# Patient Record
Sex: FEMALE | Race: WHITE | Employment: UNEMPLOYED | URBAN - METROPOLITAN AREA
[De-identification: names, ages, dates, MRNs, and addresses within clinical notes are randomized per-mention and may not be internally consistent; named-entity substitution may affect disease eponyms.]

---

## 2019-02-01 ENCOUNTER — APPOINTMENT (OUTPATIENT)
Dept: OPTOMETRY | Facility: CLINIC | Age: 64
End: 2019-02-01
Payer: COMMERCIAL

## 2019-02-01 ENCOUNTER — OFFICE VISIT (OUTPATIENT)
Dept: OPTOMETRY | Facility: CLINIC | Age: 64
End: 2019-02-01
Payer: COMMERCIAL

## 2019-02-01 DIAGNOSIS — H01.136 ECZEMATOUS DERMATITIS OF EYELIDS OF BOTH EYES: ICD-10-CM

## 2019-02-01 DIAGNOSIS — H04.129 DRY EYE: ICD-10-CM

## 2019-02-01 DIAGNOSIS — H52.203 ASTIGMATISM OF BOTH EYES, UNSPECIFIED TYPE: Primary | ICD-10-CM

## 2019-02-01 DIAGNOSIS — H01.133 ECZEMATOUS DERMATITIS OF EYELIDS OF BOTH EYES: ICD-10-CM

## 2019-02-01 DIAGNOSIS — H17.9 CORNEAL SCAR, RIGHT EYE: ICD-10-CM

## 2019-02-01 DIAGNOSIS — H52.4 PRESBYOPIA: ICD-10-CM

## 2019-02-01 DIAGNOSIS — H52.12 MYOPIA OF LEFT EYE WITH ASTIGMATISM: ICD-10-CM

## 2019-02-01 DIAGNOSIS — H26.9 BILATERAL INCIPIENT CATARACTS: ICD-10-CM

## 2019-02-01 DIAGNOSIS — H52.202 MYOPIA OF LEFT EYE WITH ASTIGMATISM: ICD-10-CM

## 2019-02-01 PROCEDURE — 92004 COMPRE OPH EXAM NEW PT 1/>: CPT | Performed by: OPTOMETRIST

## 2019-02-01 PROCEDURE — 92015 DETERMINE REFRACTIVE STATE: CPT | Performed by: OPTOMETRIST

## 2019-02-01 PROCEDURE — V2200 LENS SPHER BIFOC PLANO 4.00D: HCPCS | Mod: LT | Performed by: OPTOMETRIST

## 2019-02-01 PROCEDURE — V2020 VISION SVCS FRAMES PURCHASES: HCPCS | Performed by: OPTOMETRIST

## 2019-02-01 RX ORDER — CELECOXIB 200 MG/1
200 CAPSULE ORAL DAILY PRN
Status: ON HOLD | COMMUNITY
Start: 2018-07-20 | End: 2019-04-26

## 2019-02-01 ASSESSMENT — REFRACTION_MANIFEST
OD_SPHERE: -0.75
OS_SPHERE: -1.25
OS_SPHERE: -1.50
METHOD_AUTOREFRACTION: 1
OD_SPHERE: -0.75
OS_CYLINDER: SPHERE
OS_AXIS: 178
OD_AXIS: 163
OD_CYLINDER: +1.00
OD_CYLINDER: +1.00
OS_CYLINDER: +0.75
OD_AXIS: 168

## 2019-02-01 ASSESSMENT — VISUAL ACUITY
OD_SC+: -1
OS_SC: 20/40
OD_SC: 20/20
OS_SC: 20/30-3
METHOD: SNELLEN - LINEAR
OD_SC: 20/40
OS_SC+: -3

## 2019-02-01 ASSESSMENT — KERATOMETRY
OS_K2POWER_DIOPTERS: 43.37
OD_K2POWER_DIOPTERS: 43.50
OD_K1POWER_DIOPTERS: 43.12
OD_AXISANGLE_DEGREES: 89
OS_K1POWER_DIOPTERS: 43.37
OS_AXISANGLE_DEGREES: 90
OS_AXISANGLE2_DEGREES: 180
OD_AXISANGLE2_DEGREES: 179

## 2019-02-01 ASSESSMENT — CONF VISUAL FIELD
METHOD: COUNTING FINGERS
OD_NORMAL: 1
OS_NORMAL: 1

## 2019-02-01 ASSESSMENT — TONOMETRY
OS_IOP_MMHG: 16
IOP_METHOD: APPLANATION
OD_IOP_MMHG: 16

## 2019-02-01 ASSESSMENT — CUP TO DISC RATIO
OS_RATIO: 0.25
OD_RATIO: 0.25

## 2019-02-01 ASSESSMENT — EXTERNAL EXAM - RIGHT EYE: OD_EXAM: NORMAL

## 2019-02-01 ASSESSMENT — EXTERNAL EXAM - LEFT EYE: OS_EXAM: NORMAL

## 2019-02-01 NOTE — PROGRESS NOTES
Chief Complaint   Patient presents with     Annual Eye Exam        Patient reports she had Lasik 12-14 years ago, did have an enhancement after 6-8 weeks    Patient feels she has blepharitis on both lids      Last Eye Exam: 1 year ago at [facility]  Dilated Previously: Yes    What are you currently using to see?  readers  And has distance glasses , not with        Distance Vision Acuity: Noticed gradual change in both eyes    Near Vision Acuity: Satisfied with vision while reading  unaided    Eye Comfort: dry  Do you use eye drops? : Yes: Refresh daily  Occupation or Hobbies: Unemployed, crocheting and kniting    Stefani Silva, Optometric Assistant          Medical, surgical and family histories reviewed and updated 2/1/2019.       OBJECTIVE: See Ophthalmology exam    ASSESSMENT:    ICD-10-CM    1. Astigmatism of both eyes, unspecified type H52.203    2. Myopia of left eye with astigmatism H52.12     H52.202    3. Presbyopia H52.4    4. 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

## 2019-02-01 NOTE — PATIENT INSTRUCTIONS
For eczema  Use ointment on lids two times daily for one week or less, than as needed for flare ups, but not long term   Side effects are glaucoma and cataracts if used long term     DRY EYE TREATMENT    I recommend using artificial tears for your dry eye. There are over the counter drops that work well and may be used up to 4x daily. ( systane balance, refresh optive, soothe xp)   If you need more than 4 drops daily, use a preservative free product which come in individual vials which may be used for 24 hours and discarded.     Artificial tears work best as a preventative and not as well after your eyes are starting to bother you.  It may take 4- 6 weeks of using the drops before you notice improvement.  If after that time you are still having problems schedule an appointment for an evaluation and discussion of different treatments.  Dry eyes are a chronic condition and you may have more symptoms at certain times of the year.      Additional recommended treatment:  Warm compresses once to twice daily for 5-10 minutes    Directions for warm soaks  There are few methods for hot compresses. Moisten a washcloth with hot water, or microwave for 10 seconds, being careful to not get the cloth too hot.   Then put the washcloth onto your eyelids for 5 minutes. It will cool quickly so a rice pack or eyemask that can be heated and laid on top of the washcloth will help retain the heat.          Omega 3 fatty acid supplements taken 1-2x daily  Recommend  at least  2000mg omega 3  800 EPA  600 DHA    Blink regularly  Stay hydrated/ increase humidity  Wear sunglasses

## 2019-02-01 NOTE — LETTER
2/1/2019         RE: Serena Trujillo  [address]  [place] [address]        Dear Colleague,    Thank you for referring your patient, Serena Trujillo, to the [facility]. Please see a copy of my visit note below.    Chief Complaint   Patient presents with     Annual Eye Exam        Patient reports she had Lasik 12-14 years ago, did have an enhancement after 6-8 weeks    Patient feels she has blepharitis on both lids      Last Eye Exam: 1 year ago at [facility]  Dilated Previously: Yes    What are you currently using to see?  readers  And has distance glasses , not with        Distance Vision Acuity: Noticed gradual change in both eyes    Near Vision Acuity: Satisfied with vision while reading  unaided    Eye Comfort: dry  Do you use eye drops? : Yes: Refresh daily  Occupation or Hobbies: Unemployed, crocheting and kniting    Stefani Silva, Optometric Assistant          Medical, surgical and family histories reviewed and updated 2/1/2019.       OBJECTIVE: See Ophthalmology exam    ASSESSMENT:    ICD-10-CM    1. Astigmatism of both eyes, unspecified type H52.203    2. Myopia of left eye with astigmatism H52.12     H52.202    3. Presbyopia H52.4    4. 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        Sincerely,        Fifi Philippe, OD

## 2019-04-25 ENCOUNTER — HOSPITAL ENCOUNTER (OUTPATIENT)
Facility: CLINIC | Age: 64
Setting detail: OBSERVATION
Discharge: HOME | End: 2019-04-26
Attending: EMERGENCY MEDICINE | Admitting: HOSPITALIST
Payer: COMMERCIAL

## 2019-04-25 ENCOUNTER — APPOINTMENT (OUTPATIENT)
Dept: MRI IMAGING | Facility: CLINIC | Age: 64
End: 2019-04-25
Attending: EMERGENCY MEDICINE
Payer: COMMERCIAL

## 2019-04-25 ENCOUNTER — APPOINTMENT (OUTPATIENT)
Dept: ULTRASOUND IMAGING | Facility: CLINIC | Age: 64
End: 2019-04-25
Attending: EMERGENCY MEDICINE
Payer: COMMERCIAL

## 2019-04-25 DIAGNOSIS — G89.29 CHRONIC PAIN OF RIGHT KNEE: Primary | ICD-10-CM

## 2019-04-25 DIAGNOSIS — M25.561 CHRONIC PAIN OF RIGHT KNEE: Primary | ICD-10-CM

## 2019-04-25 DIAGNOSIS — R10.11 RUQ ABDOMINAL PAIN: ICD-10-CM

## 2019-04-25 PROBLEM — R10.9 ABDOMINAL PAIN: Status: ACTIVE | Noted: 2019-04-25

## 2019-04-25 LAB
ALBUMIN SERPL-MCNC: 4.1 G/DL (ref 3.4–5)
ALP SERPL-CCNC: 96 U/L (ref 40–150)
ALT SERPL W P-5'-P-CCNC: 27 U/L (ref 0–50)
ANION GAP SERPL CALCULATED.3IONS-SCNC: 9 MMOL/L (ref 3–14)
AST SERPL W P-5'-P-CCNC: 18 U/L (ref 0–45)
BASOPHILS # BLD AUTO: 0.1 10E9/L (ref 0–0.2)
BASOPHILS NFR BLD AUTO: 0.5 %
BILIRUB SERPL-MCNC: 0.3 MG/DL (ref 0.2–1.3)
BUN SERPL-MCNC: 21 MG/DL (ref 7–30)
CALCIUM SERPL-MCNC: 9 MG/DL (ref 8.5–10.1)
CHLORIDE SERPL-SCNC: 101 MMOL/L (ref 94–109)
CO2 SERPL-SCNC: 25 MMOL/L (ref 20–32)
CREAT SERPL-MCNC: 0.82 MG/DL (ref 0.52–1.04)
DIFFERENTIAL METHOD BLD: ABNORMAL
EOSINOPHIL # BLD AUTO: 0.1 10E9/L (ref 0–0.7)
EOSINOPHIL NFR BLD AUTO: 0.5 %
ERYTHROCYTE [DISTWIDTH] IN BLOOD BY AUTOMATED COUNT: 13.9 % (ref 10–15)
GFR SERPL CREATININE-BSD FRML MDRD: 75 ML/MIN/{1.73_M2}
GLUCOSE SERPL-MCNC: 95 MG/DL (ref 70–99)
HCT VFR BLD AUTO: 41.4 % (ref 35–47)
HGB BLD-MCNC: 13.3 G/DL (ref 11.7–15.7)
IMM GRANULOCYTES # BLD: 0.1 10E9/L (ref 0–0.4)
IMM GRANULOCYTES NFR BLD: 0.4 %
LACTATE BLD-SCNC: 0.8 MMOL/L (ref 0.7–2)
LIPASE SERPL-CCNC: 250 U/L (ref 73–393)
LYMPHOCYTES # BLD AUTO: 2.3 10E9/L (ref 0.8–5.3)
LYMPHOCYTES NFR BLD AUTO: 14.3 %
MCH RBC QN AUTO: 26.7 PG (ref 26.5–33)
MCHC RBC AUTO-ENTMCNC: 32.1 G/DL (ref 31.5–36.5)
MCV RBC AUTO: 83 FL (ref 78–100)
MONOCYTES # BLD AUTO: 0.7 10E9/L (ref 0–1.3)
MONOCYTES NFR BLD AUTO: 4.6 %
NEUTROPHILS # BLD AUTO: 12.8 10E9/L (ref 1.6–8.3)
NEUTROPHILS NFR BLD AUTO: 79.7 %
NRBC # BLD AUTO: 0 10*3/UL
NRBC BLD AUTO-RTO: 0 /100
PLATELET # BLD AUTO: 387 10E9/L (ref 150–450)
POTASSIUM SERPL-SCNC: 3.9 MMOL/L (ref 3.4–5.3)
PROCALCITONIN SERPL-MCNC: <0.05 NG/ML
PROT SERPL-MCNC: 7.6 G/DL (ref 6.8–8.8)
RBC # BLD AUTO: 4.99 10E12/L (ref 3.8–5.2)
SODIUM SERPL-SCNC: 135 MMOL/L (ref 133–144)
TROPONIN I SERPL-MCNC: <0.015 UG/L (ref 0–0.04)
WBC # BLD AUTO: 16 10E9/L (ref 4–11)

## 2019-04-25 PROCEDURE — 76705 ECHO EXAM OF ABDOMEN: CPT

## 2019-04-25 PROCEDURE — 99285 EMERGENCY DEPT VISIT HI MDM: CPT | Mod: 25

## 2019-04-25 PROCEDURE — 84145 PROCALCITONIN (PCT): CPT | Performed by: HOSPITALIST

## 2019-04-25 PROCEDURE — 93005 ELECTROCARDIOGRAM TRACING: CPT

## 2019-04-25 PROCEDURE — G0378 HOSPITAL OBSERVATION PER HR: HCPCS

## 2019-04-25 PROCEDURE — 25500064 ZZH RX 255 OP 636: Performed by: EMERGENCY MEDICINE

## 2019-04-25 PROCEDURE — 87040 BLOOD CULTURE FOR BACTERIA: CPT | Performed by: EMERGENCY MEDICINE

## 2019-04-25 PROCEDURE — 25800030 ZZH RX IP 258 OP 636: Performed by: HOSPITALIST

## 2019-04-25 PROCEDURE — 96375 TX/PRO/DX INJ NEW DRUG ADDON: CPT

## 2019-04-25 PROCEDURE — 36415 COLL VENOUS BLD VENIPUNCTURE: CPT | Performed by: EMERGENCY MEDICINE

## 2019-04-25 PROCEDURE — 80053 COMPREHEN METABOLIC PANEL: CPT | Performed by: EMERGENCY MEDICINE

## 2019-04-25 PROCEDURE — 96365 THER/PROPH/DIAG IV INF INIT: CPT

## 2019-04-25 PROCEDURE — 83690 ASSAY OF LIPASE: CPT | Performed by: EMERGENCY MEDICINE

## 2019-04-25 PROCEDURE — 84484 ASSAY OF TROPONIN QUANT: CPT | Performed by: EMERGENCY MEDICINE

## 2019-04-25 PROCEDURE — 25000132 ZZH RX MED GY IP 250 OP 250 PS 637: Performed by: EMERGENCY MEDICINE

## 2019-04-25 PROCEDURE — 74183 MRI ABD W/O CNTR FLWD CNTR: CPT

## 2019-04-25 PROCEDURE — 83605 ASSAY OF LACTIC ACID: CPT | Performed by: EMERGENCY MEDICINE

## 2019-04-25 PROCEDURE — 96361 HYDRATE IV INFUSION ADD-ON: CPT

## 2019-04-25 PROCEDURE — 36415 COLL VENOUS BLD VENIPUNCTURE: CPT | Performed by: HOSPITALIST

## 2019-04-25 PROCEDURE — 25000125 ZZHC RX 250: Performed by: EMERGENCY MEDICINE

## 2019-04-25 PROCEDURE — A9585 GADOBUTROL INJECTION: HCPCS | Performed by: EMERGENCY MEDICINE

## 2019-04-25 PROCEDURE — 85025 COMPLETE CBC W/AUTO DIFF WBC: CPT | Performed by: EMERGENCY MEDICINE

## 2019-04-25 PROCEDURE — 25000128 H RX IP 250 OP 636: Performed by: EMERGENCY MEDICINE

## 2019-04-25 PROCEDURE — 99220 ZZC INITIAL OBSERVATION CARE,LEVL III: CPT | Performed by: HOSPITALIST

## 2019-04-25 RX ORDER — AMOXICILLIN 250 MG
2 CAPSULE ORAL 2 TIMES DAILY PRN
Status: DISCONTINUED | OUTPATIENT
Start: 2019-04-25 | End: 2019-04-26 | Stop reason: HOSPADM

## 2019-04-25 RX ORDER — CEFTRIAXONE 1 G/1
1 INJECTION, POWDER, FOR SOLUTION INTRAMUSCULAR; INTRAVENOUS ONCE
Status: COMPLETED | OUTPATIENT
Start: 2019-04-25 | End: 2019-04-25

## 2019-04-25 RX ORDER — PROCHLORPERAZINE MALEATE 10 MG
10 TABLET ORAL EVERY 6 HOURS PRN
Status: DISCONTINUED | OUTPATIENT
Start: 2019-04-25 | End: 2019-04-26 | Stop reason: HOSPADM

## 2019-04-25 RX ORDER — LANOLIN ALCOHOL/MO/W.PET/CERES
3 CREAM (GRAM) TOPICAL
Status: DISCONTINUED | OUTPATIENT
Start: 2019-04-25 | End: 2019-04-26 | Stop reason: HOSPADM

## 2019-04-25 RX ORDER — VALACYCLOVIR HYDROCHLORIDE 1 G/1
1000 TABLET, FILM COATED ORAL 2 TIMES DAILY
COMMUNITY

## 2019-04-25 RX ORDER — POLYETHYLENE GLYCOL 3350 17 G/17G
17 POWDER, FOR SOLUTION ORAL DAILY PRN
Status: DISCONTINUED | OUTPATIENT
Start: 2019-04-25 | End: 2019-04-26 | Stop reason: HOSPADM

## 2019-04-25 RX ORDER — ONDANSETRON 2 MG/ML
4 INJECTION INTRAMUSCULAR; INTRAVENOUS EVERY 6 HOURS PRN
Status: DISCONTINUED | OUTPATIENT
Start: 2019-04-25 | End: 2019-04-26 | Stop reason: HOSPADM

## 2019-04-25 RX ORDER — AMOXICILLIN 250 MG
1 CAPSULE ORAL 2 TIMES DAILY PRN
Status: DISCONTINUED | OUTPATIENT
Start: 2019-04-25 | End: 2019-04-26 | Stop reason: HOSPADM

## 2019-04-25 RX ORDER — ACETAMINOPHEN 650 MG/1
650 SUPPOSITORY RECTAL EVERY 4 HOURS PRN
Status: DISCONTINUED | OUTPATIENT
Start: 2019-04-25 | End: 2019-04-26 | Stop reason: HOSPADM

## 2019-04-25 RX ORDER — BISACODYL 10 MG
10 SUPPOSITORY, RECTAL RECTAL DAILY PRN
Status: DISCONTINUED | OUTPATIENT
Start: 2019-04-25 | End: 2019-04-26 | Stop reason: HOSPADM

## 2019-04-25 RX ORDER — PROCHLORPERAZINE 25 MG
25 SUPPOSITORY, RECTAL RECTAL EVERY 12 HOURS PRN
Status: DISCONTINUED | OUTPATIENT
Start: 2019-04-25 | End: 2019-04-26 | Stop reason: HOSPADM

## 2019-04-25 RX ORDER — ONDANSETRON 4 MG/1
4 TABLET, ORALLY DISINTEGRATING ORAL EVERY 6 HOURS PRN
Status: DISCONTINUED | OUTPATIENT
Start: 2019-04-25 | End: 2019-04-26 | Stop reason: HOSPADM

## 2019-04-25 RX ORDER — SODIUM CHLORIDE, SODIUM LACTATE, POTASSIUM CHLORIDE, CALCIUM CHLORIDE 600; 310; 30; 20 MG/100ML; MG/100ML; MG/100ML; MG/100ML
INJECTION, SOLUTION INTRAVENOUS CONTINUOUS
Status: DISCONTINUED | OUTPATIENT
Start: 2019-04-25 | End: 2019-04-26

## 2019-04-25 RX ORDER — LIDOCAINE HYDROCHLORIDE 20 MG/ML
JELLY TOPICAL DAILY PRN
COMMUNITY

## 2019-04-25 RX ORDER — GADOBUTROL 604.72 MG/ML
10 INJECTION INTRAVENOUS ONCE
Status: COMPLETED | OUTPATIENT
Start: 2019-04-25 | End: 2019-04-25

## 2019-04-25 RX ORDER — ACETAMINOPHEN 325 MG/1
650 TABLET ORAL EVERY 4 HOURS PRN
Status: DISCONTINUED | OUTPATIENT
Start: 2019-04-25 | End: 2019-04-26 | Stop reason: HOSPADM

## 2019-04-25 RX ORDER — BUPROPION HYDROCHLORIDE 300 MG/1
300 TABLET ORAL EVERY MORNING
COMMUNITY

## 2019-04-25 RX ORDER — NALOXONE HYDROCHLORIDE 0.4 MG/ML
.1-.4 INJECTION, SOLUTION INTRAMUSCULAR; INTRAVENOUS; SUBCUTANEOUS
Status: DISCONTINUED | OUTPATIENT
Start: 2019-04-25 | End: 2019-04-26 | Stop reason: HOSPADM

## 2019-04-25 RX ADMIN — SODIUM CHLORIDE, POTASSIUM CHLORIDE, SODIUM LACTATE AND CALCIUM CHLORIDE: 600; 310; 30; 20 INJECTION, SOLUTION INTRAVENOUS at 19:20

## 2019-04-25 RX ADMIN — LIDOCAINE HYDROCHLORIDE 30 ML: 20 SOLUTION ORAL; TOPICAL at 15:18

## 2019-04-25 RX ADMIN — FAMOTIDINE 20 MG: 10 INJECTION INTRAVENOUS at 15:15

## 2019-04-25 RX ADMIN — GADOBUTROL 8 ML: 604.72 INJECTION INTRAVENOUS at 17:39

## 2019-04-25 RX ADMIN — CEFTRIAXONE SODIUM 1 G: 1 INJECTION, POWDER, FOR SOLUTION INTRAMUSCULAR; INTRAVENOUS at 16:29

## 2019-04-25 RX ADMIN — SODIUM CHLORIDE 1000 ML: 9 INJECTION, SOLUTION INTRAVENOUS at 15:15

## 2019-04-25 ASSESSMENT — MIFFLIN-ST. JEOR
SCORE: 1258.49
SCORE: 1281.38

## 2019-04-25 ASSESSMENT — ENCOUNTER SYMPTOMS
FEVER: 0
ABDOMINAL PAIN: 1
COUGH: 0
VOMITING: 1
SHORTNESS OF BREATH: 0

## 2019-04-25 NOTE — ED NOTES
"[facility]  ED Nurse Handoff Report    Serena Trujillo is a 63 year old female   ED Chief complaint: Abdominal Pain  . ED Diagnosis:   Final diagnoses:   None     Allergies:   Allergies   Allergen Reactions     Morphine      Penicillins      Ultram [Tramadol]        Code Status: Full Code  Activity level - Baseline/Home:  Independent. Activity Level - Current:   Independent. Lift room needed: No. Bariatric: No   Needed: No   Isolation: No. Infection: Not Applicable.     Vital Signs:   Vitals:    04/25/19 1307 04/25/19 1520   BP: (!) 203/108 171/87   Pulse:  81   Resp: 20    Temp: 97.8  F (36.6  C)    TempSrc: Oral    SpO2: 97% 97%   Weight: 77.1 kg (170 lb)    Height: 1.549 m (5' 1\")        Cardiac Rhythm:  ,      Pain level: 0-10 Pain Scale: 8  Patient confused: No. Patient Falls Risk: No.   Elimination Status: Has voided   Patient Report - Initial Complaint: abdominal pain. Focused Assessment:    Gastrointestinal - GI WDL: GI symptoms; nausea and vomiting  Nausea/Vomiting Signs/Symptoms: emesis  GI Signs/Symptoms: abdominal pain (Pt reports upper quadrant abd pain) Gastrointestinal Comment: Pt states that pain began yesterday; subsided for some time and then started again   Tests Performed:   US Abdomen Limited   Preliminary Result   IMPRESSION:    1. The common duct is mildly dilated at 1 cm, with no definite cause   identified.   2. Otherwise unremarkable right upper quadrant ultrasound.         Labs Ordered and Resulted from Time of ED Arrival Up to the Time of Departure from the ED   CBC WITH PLATELETS DIFFERENTIAL - Abnormal; Notable for the following components:       Result Value    WBC 16.0 (*)     Absolute Neutrophil 12.8 (*)     All other components within normal limits   LACTIC ACID WHOLE BLOOD   COMPREHENSIVE METABOLIC PANEL   LIPASE   TROPONIN I       Treatments provided: monitoring  Family Comments:  present  OBS brochure/video discussed/provided to patient:  " Yes  ED Medications:   Medications   0.9% sodium chloride BOLUS (1,000 mLs Intravenous New Bag 4/25/19 1515)   lidocaine VISCOUS (XYLOCAINE) 2 % 15 mL, alum & mag hydroxide-simethicone (MYLANTA ES/MAALOX  ES) 15 mL GI Cocktail (30 mLs Oral Given 4/25/19 1518)   famotidine (PEPCID) injection 20 mg (20 mg Intravenous Given 4/25/19 1515)     Drips infusing:  No  For the majority of the shift, the patient's behavior Green. Interventions performed were frequent rounding.     Severe Sepsis OR Septic Shock Diagnosis Present: No      ED Nurse Name/Phone Number: Zonia Moralez,   3:46 PM    RECEIVING UNIT ED HANDOFF REVIEW    Above ED Nurse Handoff Report was reviewed: Yes  Reviewed by: Anum Ward on April 25, 2019 at 5:29 PM

## 2019-04-25 NOTE — PHARMACY-ADMISSION MEDICATION HISTORY
Admission medication history interview status for this patient is complete. See [facility] admission navigator for allergy information, prior to admission medications and immunization status.     Medication history interview source(s):Patient  Medication history resources (including written lists, pill bottles, clinic record):Care Everywhere  [facility]    Changes made to PTA medication list:  Added: Lysine, Valtrex, Isiah/mag/zn  Deleted: Maxitrol  Changed: Wellbutrin to 300mg     Actions taken by pharmacist (provider contacted, etc):None     Additional medication history information:None    Medication reconciliation/reorder completed by provider prior to medication history? No    Do you take OTC medications (eg tylenol, ibuprofen, fish oil, eye/ear drops, etc)? Y    Prior to Admission medications    Medication Sig Last Dose Taking? Auth Provider   buPROPion (WELLBUTRIN XL) 300 MG 24 hr tablet Take 300 mg by mouth every morning 4/25/2019 at am Yes Unknown, Entered By History   Calcium-Magnesium-Zinc (ISIAH-MAG-ZINC OR) Take 1 tablet by mouth daily 4/25/2019 at Unknown time Yes Unknown, Entered By History   Coenzyme Q10-Vitamin E 100-100 MG-UNIT CAPS Take 1 capsule by mouth daily  4/25/2019 at am Yes Reported, Patient   diclofenac (VOLTAREN) 1 % topical gel Apply topically daily as needed  4/25/2019 at Unknown time Yes Reported, Patient   L-FORMULA LYSINE HCL PO Take 1 tablet by mouth daily 4/25/2019 at Unknown time Yes Unknown, Entered By History   loratadine-pseudoePHEDrine (CLARITIN-D 24-HOUR)  MG 24 hr tablet Take 1 tablet by mouth daily  4/25/2019 at Unknown time Yes Reported, Patient   polyethylene glycol 0.4%- propylene glycol 0.3% (SYSTANE ULTRA) 0.4-0.3 % SOLN ophthalmic solution Place 1 drop into both eyes 4 times daily  4/25/2019 at x1 Yes Reported, Patient   valACYclovir (VALTREX) 1000 mg tablet Take 1,000 mg by mouth 2 times daily 4/24/2019 at Unknown time Yes Unknown, Entered By History    celecoxib (CELEBREX) 200 MG capsule Take 200 mg by mouth daily as needed  More than a month at Unknown time  Reported, Patient   lidocaine (XYLOCAINE) 2 % external gel Apply topically daily as needed for moderate pain More than a month at Unknown time  Unknown, Entered By History

## 2019-04-25 NOTE — H&P
[facility]  Hospitalist H&P    Name: Serena Trujillo      MRN: [id number]  YOB: 1955    Age: 63 year old  Date of admission: 4/25/2019  Primary care provider: Sam Jovel            Assessment and Plan:   Serena Trujillo is a 63 year old female with a history of arthritis, appendectomy, hysterectomy, chronic NSAID use, depression who presents with abdominal pain (epigastric) likely related to gastritis.    1. Suspected gastritis: Large amounts of NSAID use. Pain in epigastric area. LFTs and lipase normal. US with borderline dilated CBD but no stone seen so check MRCP. If choledocholithiasis present will need GI consult and ERCP. NPO, IVF, pain/nausea control. Start IV protonix 40 mg daily.  2. Leukocytosis: Likely WBC stress demargination. No evidence of infection. Check procal. Received IV rocephin and flagyl in ED. No fever. Follow up BCx. Will not continue abx.    Code status: FULL.  Admit to OBS.  Prophylaxis: None.  Disposition: Home tomorrow.            Chief Complaint:   Abdominal pain.         History of Present Illness:   Serena Trujillo is a 63 year old female who presents with abdominal pain.  History was obtained from my discussion with the patient at the bedside.  I also discussed the case with the ED provider.  The electronic medical record was also reviewed.    She indicates her pain developed last night.  She notes most severe in the epigastric region.  Radiation to the right and left side.  Reports some mild nausea but no vomiting.  Stools are normal.  She reports large amounts of NSAID use due to arthritis.  She was able to sleep overnight but awoke with severe epigastric abdominal pain again this morning which persisted throughout the course of the day so she came to the emergency department for evaluation.  She reports a history of appendectomy and hysterectomy.  Still has her gallbladder.  Work-up in the emergency department showed  normal labs except for leukocytosis.  Right upper quadrant ultrasound showed 10 cm CBD dilation but no stone.  MRCP pending.  Surgery was contacted from the ED as well.  Patient has no other complaints at this time.            Past Medical History:     Past Medical History:   Diagnosis Date     Arthritis              Past Surgical History:     Past Surgical History:   Procedure Laterality Date     LASIK               Social History:     Social History     Tobacco Use     Smoking status: Former Smoker     Smokeless tobacco: Never Used     Tobacco comment: 26 yrs ago   Substance Use Topics     Alcohol use: Not on file             Family History:   The family history was fully reviewed and non-contributory in this case.         Allergies:     Allergies   Allergen Reactions     Morphine      Penicillins      Ultram [Tramadol]              Medications:     Prior to Admission medications    Medication Sig Last Dose Taking? Auth Provider   BUPROPION HCL ER, XL, PO Take 30 mg by mouth daily   Reported, Patient   celecoxib (CELEBREX) 200 MG capsule Take 200 mg by mouth   Reported, Patient   Coenzyme Q10-Vitamin E 100-100 MG-UNIT CAPS Take 1 capsule by mouth   Reported, Patient   diclofenac (VOLTAREN) 1 % topical gel    Reported, Patient   lidocaine (XYLOCAINE) 2 % external gel apply to affected areas up to QID prn   Reported, Patient   loratadine-pseudoePHEDrine (CLARITIN-D 24-HOUR)  MG 24 hr tablet Take 1 tablet by mouth   Reported, Patient   neomycin-polymixin-dexamethasone (MAXITROL) ophthalmic ointment Use a small amount on affected eyelid two times daily for up to a week and discontinue   Fifi Philippe, GURDEEP   polyethylene glycol 0.4%- propylene glycol 0.3% (SYSTANE ULTRA) 0.4-0.3 % SOLN ophthalmic solution Apply 1-2 drops to eye   Reported, Patient             Review of Systems:   A Comprehensive greater than 10 system review of systems was carried out.  Pertinent positives and negatives are noted above.  " Otherwise negative for contributory information.           Physical Exam:   Blood pressure 171/87, pulse 81, temperature 97.8  F (36.6  C), temperature source Oral, resp. rate 20, height 1.549 m (5' 1\"), weight 77.1 kg (170 lb), SpO2 97 %.  Wt Readings from Last 1 Encounters:   04/25/19 77.1 kg (170 lb)     Exam:  GENERAL: No apparent distress. Awake, alert, and fully oriented.  HEENT: Normocephalic, atraumatic. Extraocular movements intact.  CARDIOVASCULAR: Regular rate and rhythm without murmurs or rubs. No S3.  PULMONARY: Clear to auscultation bilaterally.  ABDOMINAL: Soft, epigastric area tender, non-distended. Bowel sounds normoactive.   EXTREMITIES: No cyanosis or clubbing. No appreciable edema.  NEUROLOGICAL: CN 2-12 grossly intact, no focal neurological deficits.  DERMATOLOGICAL: No rash, ulcer, bruising, nor jaundice.          Data:   EKG:  Personally reviewed.   ECG taken at 1431, ECG read at 1431  Normal sinus rhythm  Left axis deviation  Abnormal ECG  Rate 84 bpm. MD interval 144 ms. QRS duration 86 ms. QT/QTc 386/456 ms. P-R-T axes 0 -31 4.    Laboratory:  Recent Labs   Lab 04/25/19  1416   WBC 16.0*   HGB 13.3   HCT 41.4   MCV 83        Recent Labs   Lab 04/25/19  1416      POTASSIUM 3.9   CHLORIDE 101   CO2 25   ANIONGAP 9   GLC 95   BUN 21   CR 0.82   GFRESTIMATED 75   GFRESTBLACK 87   SATHISH 9.0     Recent Labs   Lab 04/25/19  1416   AST 18   ALT 27   ALKPHOS 96   BILITOTAL 0.3     Recent Labs   Lab 04/25/19  1416   LIPASE 250     No results for input(s): COLOR, APPEARANCE, URINEGLC, URINEBILI, URINEKETONE, SG, UBLD, URINEPH, PROTEIN, UROBILINOGEN, NITRITE, LEUKEST, RBCU, WBCU in the last 168 hours.  No results for input(s): CULT in the last 168 hours.    Imaging:  Recent Results (from the past 24 hour(s))   US Abdomen Limited    Narrative    US ABDOMEN LIMITED   4/25/2019 3:17 PM     HISTORY: Right upper quadrant pain.    COMPARISON: None.    FINDINGS: The liver is unremarkable. No " evidence for fatty  infiltration. No focal hepatic lesions. The gallbladder is  unremarkable, with no evidence for shadowing gallstones or gallbladder  wall thickening. Negative sonographic Le sign. No intrahepatic  biliary dilatation is identified. The common duct is mildly dilated,  measuring up to 1 cm in diameter. No definite cause for biliary  dilatation is identified. Limited evaluation of the right kidney is  unremarkable. Pancreas is partially obscured by overlying bowel gas,  but appears normal where seen. The abdominal aorta and IVC are of  normal caliber where visualized.      Impression    IMPRESSION:   1. The common duct is mildly dilated at 1 cm, with no definite cause  identified.  2. Otherwise unremarkable right upper quadrant ultrasound.        Heriberto Monroe DO MPH  [facility] Hospitalist  [address] [place] [address]  Pager: [phone]  04/25/2019

## 2019-04-26 VITALS
WEIGHT: 175.04 LBS | SYSTOLIC BLOOD PRESSURE: 126 MMHG | HEIGHT: 61 IN | DIASTOLIC BLOOD PRESSURE: 66 MMHG | OXYGEN SATURATION: 93 % | BODY MASS INDEX: 33.05 KG/M2 | RESPIRATION RATE: 16 BRPM | TEMPERATURE: 97.8 F | HEART RATE: 70 BPM

## 2019-04-26 LAB
ALBUMIN SERPL-MCNC: 3.2 G/DL (ref 3.4–5)
ALP SERPL-CCNC: 75 U/L (ref 40–150)
ALT SERPL W P-5'-P-CCNC: 23 U/L (ref 0–50)
ANION GAP SERPL CALCULATED.3IONS-SCNC: 3 MMOL/L (ref 3–14)
AST SERPL W P-5'-P-CCNC: 13 U/L (ref 0–45)
BILIRUB DIRECT SERPL-MCNC: <0.1 MG/DL (ref 0–0.2)
BILIRUB SERPL-MCNC: 0.4 MG/DL (ref 0.2–1.3)
BUN SERPL-MCNC: 13 MG/DL (ref 7–30)
CALCIUM SERPL-MCNC: 8.1 MG/DL (ref 8.5–10.1)
CHLORIDE SERPL-SCNC: 112 MMOL/L (ref 94–109)
CO2 SERPL-SCNC: 28 MMOL/L (ref 20–32)
CREAT SERPL-MCNC: 0.85 MG/DL (ref 0.52–1.04)
ERYTHROCYTE [DISTWIDTH] IN BLOOD BY AUTOMATED COUNT: 13.9 % (ref 10–15)
GFR SERPL CREATININE-BSD FRML MDRD: 72 ML/MIN/{1.73_M2}
GLUCOSE SERPL-MCNC: 85 MG/DL (ref 70–99)
HCT VFR BLD AUTO: 38.9 % (ref 35–47)
HGB BLD-MCNC: 12.1 G/DL (ref 11.7–15.7)
INTERPRETATION ECG - MUSE: NORMAL
MCH RBC QN AUTO: 26.1 PG (ref 26.5–33)
MCHC RBC AUTO-ENTMCNC: 31.1 G/DL (ref 31.5–36.5)
MCV RBC AUTO: 84 FL (ref 78–100)
PLATELET # BLD AUTO: 308 10E9/L (ref 150–450)
POTASSIUM SERPL-SCNC: 4 MMOL/L (ref 3.4–5.3)
PROT SERPL-MCNC: 6.1 G/DL (ref 6.8–8.8)
RBC # BLD AUTO: 4.64 10E12/L (ref 3.8–5.2)
SODIUM SERPL-SCNC: 143 MMOL/L (ref 133–144)
UPPER GI ENDOSCOPY: NORMAL
WBC # BLD AUTO: 8.2 10E9/L (ref 4–11)

## 2019-04-26 PROCEDURE — 80048 BASIC METABOLIC PNL TOTAL CA: CPT | Performed by: HOSPITALIST

## 2019-04-26 PROCEDURE — 80076 HEPATIC FUNCTION PANEL: CPT | Performed by: HOSPITALIST

## 2019-04-26 PROCEDURE — 25000128 H RX IP 250 OP 636: Performed by: HOSPITALIST

## 2019-04-26 PROCEDURE — 88341 IMHCHEM/IMCYTCHM EA ADD ANTB: CPT | Mod: 26,59 | Performed by: INTERNAL MEDICINE

## 2019-04-26 PROCEDURE — 25000128 H RX IP 250 OP 636: Performed by: INTERNAL MEDICINE

## 2019-04-26 PROCEDURE — 88342 IMHCHEM/IMCYTCHM 1ST ANTB: CPT | Mod: 26,59 | Performed by: INTERNAL MEDICINE

## 2019-04-26 PROCEDURE — G0378 HOSPITAL OBSERVATION PER HR: HCPCS

## 2019-04-26 PROCEDURE — 36415 COLL VENOUS BLD VENIPUNCTURE: CPT | Performed by: HOSPITALIST

## 2019-04-26 PROCEDURE — 25000132 ZZH RX MED GY IP 250 OP 250 PS 637: Performed by: PHYSICIAN ASSISTANT

## 2019-04-26 PROCEDURE — 25000125 ZZHC RX 250: Performed by: INTERNAL MEDICINE

## 2019-04-26 PROCEDURE — 43251 EGD REMOVE LESION SNARE: CPT | Performed by: INTERNAL MEDICINE

## 2019-04-26 PROCEDURE — 43243 EGD INJECTION VARICES: CPT | Performed by: INTERNAL MEDICINE

## 2019-04-26 PROCEDURE — 25800030 ZZH RX IP 258 OP 636: Performed by: HOSPITALIST

## 2019-04-26 PROCEDURE — 99217 ZZC OBSERVATION CARE DISCHARGE: CPT | Performed by: PHYSICIAN ASSISTANT

## 2019-04-26 PROCEDURE — 88305 TISSUE EXAM BY PATHOLOGIST: CPT | Performed by: INTERNAL MEDICINE

## 2019-04-26 PROCEDURE — 85027 COMPLETE CBC AUTOMATED: CPT | Performed by: HOSPITALIST

## 2019-04-26 PROCEDURE — G0500 MOD SEDAT ENDO SERVICE >5YRS: HCPCS | Performed by: INTERNAL MEDICINE

## 2019-04-26 PROCEDURE — 27210585 ZZH DEVICE CLIP QUICK: Performed by: INTERNAL MEDICINE

## 2019-04-26 PROCEDURE — 88341 IMHCHEM/IMCYTCHM EA ADD ANTB: CPT | Performed by: INTERNAL MEDICINE

## 2019-04-26 PROCEDURE — 43239 EGD BIOPSY SINGLE/MULTIPLE: CPT | Mod: XS | Performed by: INTERNAL MEDICINE

## 2019-04-26 PROCEDURE — 88342 IMHCHEM/IMCYTCHM 1ST ANTB: CPT | Performed by: INTERNAL MEDICINE

## 2019-04-26 PROCEDURE — 96375 TX/PRO/DX INJ NEW DRUG ADDON: CPT | Mod: 59

## 2019-04-26 PROCEDURE — C9113 INJ PANTOPRAZOLE SODIUM, VIA: HCPCS | Performed by: HOSPITALIST

## 2019-04-26 PROCEDURE — 43255 EGD CONTROL BLEEDING ANY: CPT | Performed by: INTERNAL MEDICINE

## 2019-04-26 RX ORDER — LIDOCAINE 40 MG/G
CREAM TOPICAL
Status: DISCONTINUED | OUTPATIENT
Start: 2019-04-26 | End: 2019-04-26 | Stop reason: HOSPADM

## 2019-04-26 RX ORDER — BUPROPION HYDROCHLORIDE 150 MG/1
300 TABLET ORAL EVERY MORNING
Status: DISCONTINUED | OUTPATIENT
Start: 2019-04-26 | End: 2019-04-26 | Stop reason: HOSPADM

## 2019-04-26 RX ORDER — NALOXONE HYDROCHLORIDE 0.4 MG/ML
.1-.4 INJECTION, SOLUTION INTRAMUSCULAR; INTRAVENOUS; SUBCUTANEOUS
Status: DISCONTINUED | OUTPATIENT
Start: 2019-04-26 | End: 2019-04-26 | Stop reason: HOSPADM

## 2019-04-26 RX ORDER — FLUMAZENIL 0.1 MG/ML
0.2 INJECTION, SOLUTION INTRAVENOUS
Status: DISCONTINUED | OUTPATIENT
Start: 2019-04-26 | End: 2019-04-26 | Stop reason: HOSPADM

## 2019-04-26 RX ORDER — SODIUM CHLORIDE, SODIUM LACTATE, POTASSIUM CHLORIDE, CALCIUM CHLORIDE 600; 310; 30; 20 MG/100ML; MG/100ML; MG/100ML; MG/100ML
INJECTION, SOLUTION INTRAVENOUS CONTINUOUS
Status: DISCONTINUED | OUTPATIENT
Start: 2019-04-26 | End: 2019-04-26 | Stop reason: HOSPADM

## 2019-04-26 RX ORDER — FENTANYL CITRATE 50 UG/ML
INJECTION, SOLUTION INTRAMUSCULAR; INTRAVENOUS PRN
Status: DISCONTINUED | OUTPATIENT
Start: 2019-04-26 | End: 2019-04-26 | Stop reason: HOSPADM

## 2019-04-26 RX ORDER — VALACYCLOVIR HYDROCHLORIDE 1 G/1
1000 TABLET, FILM COATED ORAL 2 TIMES DAILY
Status: DISCONTINUED | OUTPATIENT
Start: 2019-04-26 | End: 2019-04-26

## 2019-04-26 RX ORDER — BUPROPION HYDROCHLORIDE 150 MG/1
300 TABLET ORAL EVERY MORNING
Status: DISCONTINUED | OUTPATIENT
Start: 2019-04-26 | End: 2019-04-26

## 2019-04-26 RX ADMIN — SODIUM CHLORIDE, POTASSIUM CHLORIDE, SODIUM LACTATE AND CALCIUM CHLORIDE: 600; 310; 30; 20 INJECTION, SOLUTION INTRAVENOUS at 03:56

## 2019-04-26 RX ADMIN — PANTOPRAZOLE SODIUM 40 MG: 40 INJECTION, POWDER, FOR SOLUTION INTRAVENOUS at 08:28

## 2019-04-26 RX ADMIN — BUPROPION HYDROCHLORIDE 300 MG: 150 TABLET, FILM COATED, EXTENDED RELEASE ORAL at 11:55

## 2019-04-26 NOTE — PLAN OF CARE
PRIMARY DIAGNOSIS: ACUTE PAIN  OUTPATIENT/OBSERVATION GOALS TO BE MET BEFORE DISCHARGE:  1. Pain Status: Pain free.    2. Return to near baseline physical activity: Yes    3. Cleared for discharge by consultants (if involved): No    Discharge Planner Nurse   Safe discharge environment identified: Yes  Barriers to discharge: Yes       Entered by: Anum Ward 04/25/2019 10:12 PM     Please review provider order for any additional goals.   Nurse to notify provider when observation goals have been met and patient is ready for discharge.    Pt is AOx4, up independently in room, VSS. Pt denies pain/discomfort. Abdomen is soft and tender to touch, bowels hypoactive. Pt is NPO except for ice chips. IV fluids infusing. Voiding in adequate amounts. Will continue to monitor.

## 2019-04-26 NOTE — PLAN OF CARE
"PRIMARY DIAGNOSIS: ACUTE PAIN  OUTPATIENT/OBSERVATION GOALS TO BE MET BEFORE DISCHARGE:  1. Pain Status: Pain free.    2. Return to near baseline physical activity: Yes    3. Cleared for discharge by consultants (if involved): No - GI to see    Discharge Planner Nurse   Safe discharge environment identified: Yes  Barriers to discharge: Yes - GI consult       Entered by: Louise Zapata 04/26/2019        VSS. Denies except \"a tiny bit of tenderness\" in epigastric area. BS hypo. + flatus. Denies nausea. Moving ind, steady gait observed. Patient showered independently. MRCP showed questionable fluid in gall bladder, GI consult placed.     Please review provider order for any additional goals.   Nurse to notify provider when observation goals have been met and patient is ready for discharge.  "

## 2019-04-26 NOTE — DISCHARGE SUMMARY
Discharge Summary  Hospitalist Service    Serena Trujillo MRN# [id number]   YOB: 1955 Age: 63 year old     Date of Admission:  4/25/2019  Date of Discharge:  4/26/2019  Admitting Physician: Heriberto Monroe DO  Discharge Physician: Stefani Philippe PA-C  Discharging Service: Hospitalist Service     Primary Provider: Sam Jovel  Primary Care Physician Phone Number: [phone]         Discharge Diagnoses/Problem Oriented Hospital Course (Providers):    Serena Trujillo was admitted on 4/25/2019 by Heriberto Monroe DO and I would refer you to their history and physical.  The following problems were addressed during her hospitalization:    1. Epigastric abdominal pain  Acute onset of epigastric abdominal pain. LFTs and lipase are normal. US with borderline dilated CBD with MRCP showing no stone. Symptoms initially seemed consistent with gastritis likely related to frequent NSAID use. She was placed NPO with addition of Protonix with improvement in pain. GI consulted and EGD performed did not show significant inflammation, but did show non bleeding polyp in stomach that as biopsied and removed.   -Recommended stopping NSAIDS for 2 weeks and then use more sparingly and also given prescription for Omeprazole.   -Not clear what was cause of pain, esophageal spasm??    2. Incidental fluid found in gallbladder and CBD and 0.6 cm cystic lesion along the medial proximal descending duodenum  MRCP noted Mildly prominent common bile duct and appearance suggests proteinaceous fluid or possibly blood in the gallbladder and common bile duct. Also seen was a 0.6 cm high T2 signal structure consistent with small cystic lesion or possibly thin-walled duodenal diverticulum along the medial proximal descending duodenum just superior to the pancreatic head.   -Consulted GI who was not concerned about these findings.  -Verbally told patient to follow up with PCP and consider repeat  imaging in 6-12 months or consider referral for 2nd opinion.    3. Right knee arthritis  Told to stop using NSAIDS  -Gave prescription for Voltaren gel           Code Status:      Full Code        Brief Hospital Stay Summary Sent Home With Patient in AVS:        Reason for your hospital stay      You were admitted for concerns of upper stomach pain that is now   resolved. You were seen by Dr. Patricio from gastroenterology who   recommended an EGD which did not show significant stomach inflammation but   did show 1 polyp which he removed and biopsied. He will call you with   results.     You should not take NSAIDS for 2 weeks. If you need to restart them to   control pain continues to use the daily Omeprazole to protect your   stomach.    Incidentally, your MRCP showed a thicker fluid in the gallbladder and   common bile duct. Dr. Patricio is not concerned about this given your   normal liver function tests.    Incidentally, your MRCP also shows a small cystic lesion or possibly   thin-walled duodenal diverticulum along the medial  proximal descending duodenum just superior to the pancreatic head. Dr. Patricio is also not concerned about this.                           Pending Results:        Unresulted Labs Ordered in the Past 30 Days of this Admission     Date and Time Order Name Status Description    4/26/2019 1252 Surgical pathology exam In process     4/25/2019 1609 Blood culture ONE site Preliminary     4/25/2019 1554 Blood culture ONE site Preliminary             Discharge Instructions and Follow-Up:      Follow-up Appointments     Follow-up and recommended labs and tests       Follow up with primary care provider, Sam Jovel, within 7 days   for hospital follow- up.  The following labs/tests are recommended: Repeat   LFTs, review of MRCP, consider for referral for second opinion of cystic   lesion seen along the medial proximal descending duodenum just superior to   the pancreatic head                Discharge Disposition:      Discharged to home         Discharge Medications:        Current Discharge Medication List      START taking these medications    Details   omeprazole (PRILOSEC) 20 MG DR capsule Take 1 capsule (20 mg) by mouth daily  Qty: 30 capsule, Refills: 1    Associated Diagnoses: RUQ abdominal pain         CONTINUE these medications which have CHANGED    Details   diclofenac (VOLTAREN) 1 % topical gel Apply 4 g topically 4 times daily as needed for moderate pain  Qty: 1 Tube, Refills: 1    Associated Diagnoses: Chronic pain of right knee         CONTINUE these medications which have NOT CHANGED    Details   buPROPion (WELLBUTRIN XL) 300 MG 24 hr tablet Take 300 mg by mouth every morning      Calcium-Magnesium-Zinc (SATHISH-MAG-ZINC OR) Take 1 tablet by mouth daily      Coenzyme Q10-Vitamin E 100-100 MG-UNIT CAPS Take 1 capsule by mouth daily       L-FORMULA LYSINE HCL PO Take 1 tablet by mouth daily      loratadine-pseudoePHEDrine (CLARITIN-D 24-HOUR)  MG 24 hr tablet Take 1 tablet by mouth daily       polyethylene glycol 0.4%- propylene glycol 0.3% (SYSTANE ULTRA) 0.4-0.3 % SOLN ophthalmic solution Place 1 drop into both eyes 4 times daily       valACYclovir (VALTREX) 1000 mg tablet Take 1,000 mg by mouth 2 times daily      lidocaine (XYLOCAINE) 2 % external gel Apply topically daily as needed for moderate pain         STOP taking these medications       celecoxib (CELEBREX) 200 MG capsule Comments:   Reason for Stopping:                 Allergies:         Allergies   Allergen Reactions     Morphine Nausea     Penicillins Hives and Itching     Itchy throat when 5 years old.   Has tolerated cephalosporins.      Ultram [Tramadol] Nausea and Visual Disturbance           Consultations This Hospital Stay:      Consultation during this admission received from gastroenterology         Condition and Physical on Discharge:      Discharge condition: Stable   Vitals: Blood pressure 151/66, pulse 70,  "temperature 97.9  F (36.6  C), temperature source Oral, resp. rate 16, height 1.549 m (5' 1\"), weight 79.4 kg (175 lb 0.7 oz), SpO2 94 %.     Constitutional: Alert and orientated   Lungs: CTAB   Cardiovascular: RRR with no murmur   Abdomen: Bowel sounds are present with no tenderness   Skin: No rash or open sores   Other:          Discharge Time:      Less than 30 minutes.        Image Results From This Hospital Stay (For Non-EPIC Providers):        Results for orders placed or performed during the hospital encounter of 04/25/19   US Abdomen Limited    Narrative    US ABDOMEN LIMITED   4/25/2019 3:17 PM     HISTORY: Right upper quadrant pain.    COMPARISON: None.    FINDINGS: The liver is unremarkable. No evidence for fatty  infiltration. No focal hepatic lesions. The gallbladder is  unremarkable, with no evidence for shadowing gallstones or gallbladder  wall thickening. Negative sonographic Le sign. No intrahepatic  biliary dilatation is identified. The common duct is mildly dilated,  measuring up to 1 cm in diameter. No definite cause for biliary  dilatation is identified. Limited evaluation of the right kidney is  unremarkable. Pancreas is partially obscured by overlying bowel gas,  but appears normal where seen. The abdominal aorta and IVC are of  normal caliber where visualized.      Impression    IMPRESSION:   1. The common duct is mildly dilated at 1 cm, with no definite cause  identified.  2. Otherwise unremarkable right upper quadrant ultrasound.     ROBIN NOONAN MD   MR Abdomen MRCP w/o & w Contrast    Narrative    MR ABDOMEN MRCP WITHOUT AND WITH CONTRAST  4/25/2019  5:48 PM    HISTORY: Common bile duct dilation on ultrasound. Epigastric abdominal  pain, nausea, and vomiting since last night.    COMPARISON: 4/25/2019 right upper quadrant ultrasound.    TECHNIQUE: Multiplanar multisequence MR abdomen with and without IV  contrast. MRCP. Contrast: 8 mL Gadavist.    FINDINGS: No evidence for " intrahepatic biliary duct dilatation. There  is increased T1 signal in the gallbladder and common bile duct,  unchanged in appearance on postcontrast images. This demonstrates  decreased T2 signal intensity and suggests complex fluid such as blood  or proteinaceous fluid in the gallbladder and common bile duct. Common  bile duct is mildly prominent on axial T1-weighted images, but poorly  visualized on T2 images. No filling defect identified in the  gallbladder or common duct.    There is a 0.6 cm high T2 signal round structure along the medial  proximal descending duodenum series 15 image 13, possibly fluid within  a thin-walled duodenal diverticulum, cannot exclude a small cystic  structure along the superior pancreatic head. No evidence for  pancreatic duct dilatation.    Normal-appearing liver, gallbladder, spleen, pancreas and adrenal  glands. Subcentimeter medial mid right renal cyst and medial upper  left renal cyst. The kidneys otherwise appear normal. Adrenal glands  unremarkable.  This report has been revised completely since the original preliminary  report on 4/25/2019 and this revision was discussed by myself with the  patient's second floor nurse Liana 4/26/2019 at 10:31 AM who will  advise the patient's physician of this new MRI report.      Impression    IMPRESSION:   1. No convincing biliary duct dilatation demonstrated.  2. Mildly prominent common bile duct and appearance suggests  proteinaceous fluid or possibly blood in the gallbladder and common  bile duct. Recommend gastroenterology consult. Further evaluation with  ERCP or endoscopic ultrasound may be helpful.  3. 0.6 cm high T2 signal structure consistent with small cystic lesion  or possibly thin-walled duodenal diverticulum along the medial  proximal descending duodenum just superior to the pancreatic head.  Exophytic cystic pancreatic lesion arising off upper pancreatic head  cannot be excluded.     ALYSIA BOATENG MD           Most Recent  Lab Results In EPIC (For Non-EPIC Providers):    Most Recent 3 CBC's:  Recent Labs   Lab Test 04/26/19  0608 04/25/19  1416 08/31/11  1246   WBC 8.2 16.0* 6.5   HGB 12.1 13.3 14.3   MCV 84 83 83    387 214      Most Recent 3 BMP's:  Recent Labs   Lab Test 04/26/19  0608 04/25/19  1416 08/31/11  1246    135 134   POTASSIUM 4.0 3.9 3.8   CHLORIDE 112* 101 102   CO2 28 25 24   BUN 13 21 15   CR 0.85 0.82 0.80   ANIONGAP 3 9 8   SATHISH 8.1* 9.0 8.2*   GLC 85 95 105*     Most Recent 3 Troponin's:No lab results found.  Most Recent 3 INR's:No lab results found.  Most Recent 2 LFT's:  Recent Labs   Lab Test 04/26/19  0608 04/25/19  1416   AST 13 18   ALT 23 27   ALKPHOS 75 96   BILITOTAL 0.4 0.3     Most Recent Cholesterol Panel:No lab results found.  Most Recent 6 Bacteria Isolates From Any Culture (See EPIC Reports for Culture Details):  Recent Labs   Lab Test 04/25/19  1629 04/25/19  1416   CULT No growth after 7 hours No growth after 7 hours     Most Recent TSH, T4 and HgbA1c: No lab results found.

## 2019-04-26 NOTE — OR NURSING
Patient tolerated the procedure well. Report called to patients primary RN on the floor.Patient in recovery in a stable condition.

## 2019-04-26 NOTE — PLAN OF CARE
"PRIMARY DIAGNOSIS: ACUTE PAIN  OUTPATIENT/OBSERVATION GOALS TO BE MET BEFORE DISCHARGE:  1. Pain Status: Pain free.    2. Return to near baseline physical activity: Yes    3. Cleared for discharge by consultants (if involved): No - GI to see    Discharge Planner Nurse   Safe discharge environment identified: Yes   Barriers to discharge: No       Entered by: Louise Zapata 04/26/2019        VSS. Denies except \"a tiny bit of tenderness\" in epigastric area. BS hypo. + flatus. EGD performed by Dr. Patricio. OK to discharge per GI. Tolerating clears.  bedside and supportive.     Please review provider order for any additional goals.   Nurse to notify provider when observation goals have been met and patient is ready for discharge.  "

## 2019-04-26 NOTE — H&P
Pre-Endoscopy History and Physical     Serena Trujillo MRN# [id number]   YOB: 1955 Age: 63 year old     Date of Procedure: 4/25/2019  Primary care provider: Sam Jovel  Type of Endoscopy: Gastroscopy with possible biopsy, possible dilation  Reason for Procedure: pain  Type of Anesthesia Anticipated: Conscious Sedation    HPI:    Serena is a 63 year old female who will be undergoing the above procedure.      A history and physical has been performed. The patient's medications and allergies have been reviewed. The risks and benefits of the procedure and the sedation options and risks were discussed with the patient.  All questions were answered and informed consent was obtained.      She denies a personal or family history of anesthesia complications or bleeding disorders.     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     Abdominal pain        Past Medical History:   Diagnosis Date     Arthritis      Depressive disorder         Past Surgical History:   Procedure Laterality Date     APPENDECTOMY       COLONOSCOPY       GYN SURGERY       LASIK       ORTHOPEDIC SURGERY         Social History     Tobacco Use     Smoking status: Former Smoker     Smokeless tobacco: Never Used     Tobacco comment: 26 yrs ago   Substance Use Topics     Alcohol use: Not Currently       Family History   Problem Relation Age of Onset     Cancer Mother      Cancer Father      Cancer Paternal Grandmother      Glaucoma No family hx of      Macular Degeneration No family hx of        Prior to Admission medications    Medication Sig Start Date End Date Taking? Authorizing Provider   buPROPion (WELLBUTRIN XL) 300 MG 24 hr tablet Take 300 mg by mouth every morning   Yes Unknown, Entered By History   Calcium-Magnesium-Zinc (SATHISH-MAG-ZINC OR) Take 1 tablet by mouth daily   Yes Unknown, Entered By History   Coenzyme Q10-Vitamin E 100-100 MG-UNIT  "CAPS Take 1 capsule by mouth daily  7/22/13  Yes Reported, Patient   diclofenac (VOLTAREN) 1 % topical gel Apply topically daily as needed  7/30/18  Yes Reported, Patient   L-FORMULA LYSINE HCL PO Take 1 tablet by mouth daily   Yes Unknown, Entered By History   loratadine-pseudoePHEDrine (CLARITIN-D 24-HOUR)  MG 24 hr tablet Take 1 tablet by mouth daily  4/28/14  Yes Reported, Patient   polyethylene glycol 0.4%- propylene glycol 0.3% (SYSTANE ULTRA) 0.4-0.3 % SOLN ophthalmic solution Place 1 drop into both eyes 4 times daily  10/29/14  Yes Reported, Patient   valACYclovir (VALTREX) 1000 mg tablet Take 1,000 mg by mouth 2 times daily   Yes Unknown, Entered By History   celecoxib (CELEBREX) 200 MG capsule Take 200 mg by mouth daily as needed  7/20/18   Reported, Patient   lidocaine (XYLOCAINE) 2 % external gel Apply topically daily as needed for moderate pain    Unknown, Entered By History       Allergies   Allergen Reactions     Morphine Nausea     Penicillins Hives and Itching     Itchy throat when 5 years old.   Has tolerated cephalosporins.      Ultram [Tramadol] Nausea and Visual Disturbance        REVIEW OF SYSTEMS:   5 point ROS negative except as noted above in HPI, including Gen., Resp., CV, GI &  system review.    PHYSICAL EXAM:   /73   Pulse 96   Temp 97.8  F (36.6  C) (Oral)   Resp 14   Ht 1.549 m (5' 1\")   Wt 79.4 kg (175 lb 0.7 oz)   SpO2 96%   BMI 33.07 kg/m   Estimated body mass index is 33.07 kg/m  as calculated from the following:    Height as of this encounter: 1.549 m (5' 1\").    Weight as of this encounter: 79.4 kg (175 lb 0.7 oz).   GENERAL APPEARANCE: alert, and oriented  MENTAL STATUS: alert  AIRWAY EXAM: Mallampatti Class I (visualization of the soft palate, fauces, uvula, anterior and posterior pillars)  RESP: lungs clear to auscultation - no rales, rhonchi or wheezes  CV: regular rates and rhythm  DIAGNOSTICS:    Not indicated    IMPRESSION   ASA Class 2 - Mild systemic " disease    PLAN:   Plan for Gastroscopy with possible biopsy, possible dilation. We discussed the risks, benefits and alternatives and the patient wished to proceed.    The above has been forwarded to the consulting provider.      Signed Electronically by: Jeanmarie Patricio  April 26, 2019

## 2019-04-26 NOTE — PROGRESS NOTES
There was a polyp in the distal stomach which was removed. Biopsies were taken for H. Pylori. No inflammation was seen in the entire upper gut.

## 2019-04-26 NOTE — PLAN OF CARE
"PRIMARY DIAGNOSIS: ACUTE PAIN  OUTPATIENT/OBSERVATION GOALS TO BE MET BEFORE DISCHARGE:  1. Pain Status: Pain free.      2. Return to near baseline physical activity: Yes     3. Cleared for discharge by consultants (if involved): No    /78   Pulse 84   Temp 97.7  F (36.5  C) (Oral)   Resp 16   Ht 1.549 m (5' 1\")   Wt 79.4 kg (175 lb 0.7 oz)   SpO2 94%   BMI 33.07 kg/m         Discharge Planner Nurse   Safe discharge environment identified: Yes  Barriers to discharge: Yes         Please review provider order for any additional goals.   Nurse to notify provider when observation goals have been met and patient is ready for discharge.     Pt is AOx4, up independently in room, VSS. Pt denies pain/discomfort. Abdomen is soft and tender to touch, bowels hypoactive. Pt is NPO except for ice chips. IV fluids infusing. Voiding in adequate amounts. Will continue to monitor.             "

## 2019-04-26 NOTE — PLAN OF CARE
Patient's After Visit Summary was reviewed with patient and/or spouse.   Patient verbalized understanding of After Visit Summary, recommended follow up and was given an opportunity to ask questions.   Discharge medications sent home with patient/family: Not applicable.     Discharged with spouse, wheelchair ride provided.     OBSERVATION patient END time: 9889

## 2019-04-30 LAB — COPATH REPORT: NORMAL

## 2019-05-01 LAB
BACTERIA SPEC CULT: NO GROWTH
BACTERIA SPEC CULT: NO GROWTH
Lab: NORMAL
Lab: NORMAL
SPECIMEN SOURCE: NORMAL
SPECIMEN SOURCE: NORMAL

## 2019-08-22 ENCOUNTER — OFFICE VISIT (OUTPATIENT)
Dept: URGENT CARE | Facility: URGENT CARE | Age: 64
End: 2019-08-22
Payer: COMMERCIAL

## 2019-08-22 ENCOUNTER — ANCILLARY PROCEDURE (OUTPATIENT)
Dept: GENERAL RADIOLOGY | Facility: CLINIC | Age: 64
End: 2019-08-22
Attending: FAMILY MEDICINE
Payer: COMMERCIAL

## 2019-08-22 VITALS
BODY MASS INDEX: 30.23 KG/M2 | OXYGEN SATURATION: 99 % | HEART RATE: 74 BPM | WEIGHT: 160 LBS | SYSTOLIC BLOOD PRESSURE: 128 MMHG | DIASTOLIC BLOOD PRESSURE: 64 MMHG | TEMPERATURE: 98 F

## 2019-08-22 DIAGNOSIS — S99.921A TOE INJURY, RIGHT, INITIAL ENCOUNTER: ICD-10-CM

## 2019-08-22 DIAGNOSIS — S92.501A CLOSED FRACTURE OF FIFTH TOE OF RIGHT FOOT, INITIAL ENCOUNTER: Primary | ICD-10-CM

## 2019-08-22 PROCEDURE — 99203 OFFICE O/P NEW LOW 30 MIN: CPT | Performed by: FAMILY MEDICINE

## 2019-08-22 PROCEDURE — 73660 X-RAY EXAM OF TOE(S): CPT | Mod: RT

## 2019-08-22 RX ORDER — HYDROCODONE BITARTRATE AND ACETAMINOPHEN 5; 325 MG/1; MG/1
1 TABLET ORAL EVERY 6 HOURS PRN
Qty: 12 TABLET | Refills: 0 | Status: SHIPPED | OUTPATIENT
Start: 2019-08-22

## 2019-08-22 NOTE — PROGRESS NOTES
SUBJECTIVE:  Chief Complaint   Patient presents with     Urgent Care     Toe Injury     R pinky toe injury- tripped over something on the floor last night     Serena Trujillo is a 64 year old female presents with a chief complaint of right foot - 5th toe  pain.  The injury occurred 30 minutes ago.   The injury happened while at home. How: tripped over her computer bag, developed swelling and toe deformed.  The patient complained of moderate pain  and has had decreased ROM.  Pain exacerbated by weight-bearing.  Relieved by ice.  She treated it initially with ice. This is not the first time this type of injury has occurred to this patient.     Past Medical History:   Diagnosis Date     Arthritis      Depressive disorder      Current Outpatient Medications   Medication Sig Dispense Refill     buPROPion (WELLBUTRIN XL) 300 MG 24 hr tablet Take 300 mg by mouth every morning       Calcium-Magnesium-Zinc (SATHISH-MAG-ZINC OR) Take 1 tablet by mouth daily       Coenzyme Q10-Vitamin E 100-100 MG-UNIT CAPS Take 1 capsule by mouth daily        diclofenac (VOLTAREN) 1 % topical gel Apply 4 g topically 4 times daily as needed for moderate pain 1 Tube 1     L-FORMULA LYSINE HCL PO Take 1 tablet by mouth daily       lidocaine (XYLOCAINE) 2 % external gel Apply topically daily as needed for moderate pain       loratadine-pseudoePHEDrine (CLARITIN-D 24-HOUR)  MG 24 hr tablet Take 1 tablet by mouth daily        omeprazole (PRILOSEC) 20 MG DR capsule Take 1 capsule (20 mg) by mouth daily 30 capsule 1     polyethylene glycol 0.4%- propylene glycol 0.3% (SYSTANE ULTRA) 0.4-0.3 % SOLN ophthalmic solution Place 1 drop into both eyes 4 times daily        valACYclovir (VALTREX) 1000 mg tablet Take 1,000 mg by mouth 2 times daily       Social History     Tobacco Use     Smoking status: Former Smoker     Smokeless tobacco: Never Used     Tobacco comment: 26 yrs ago   Substance Use Topics     Alcohol use: Not Currently        ROS:  Review of systems negative except as stated above.    EXAM:   /64   Pulse 74   Temp 98  F (36.7  C) (Tympanic)   Wt 72.6 kg (160 lb)   SpO2 99%   BMI 30.23 kg/m    Gen: healthy,alert,no distress  Extremity: right foot - 5th toe has swelling, deformity, mild bruising, tenderness.  No discomfort on metatarsals  There is not compromise to the distal circulation.  Pulses are +2 and CRT is brisk  GENERAL APPEARANCE: healthy, alert and no distress  EXTREMITIES: peripheral pulses normal  PSYCH: alert, affect bright    X-RAY was done - right 5th toe - fracture of proximal phalanx of 5th toe personally viewed by me    ASSESSMENT/PLAN:   (S92.501A) Closed fracture of fifth toe of right foot, initial encounter  (primary encounter diagnosis)  Plan: HYDROcodone-acetaminophen (NORCO) 5-325 MG         tablet, order for DME        Stefanie tape    (S99.921A) Toe injury, right, initial encounter  Comment: toe fracture  Plan: XR Toe Right G/E 2 Views            Stefanie tape toes for comfort, reviewed symptomatic treatment with tylenol, ibuprofen, ice, rest, elevation.  Reviewed that broken bone will need 4 weeks to heal.  DME for post op shoe to wear, continue to stefanie tape for support and comfort.  Small quantity of Norco 5/325 mg #12 given to take sparingly for worse pain.    Follow up with primary provider or podiatry if no improvement of symptoms in 1-2 week    Kiet Donaldson MD

## 2019-08-22 NOTE — PATIENT INSTRUCTIONS
Okay to take ibuprofen 200 mg - 3 tablets (600 mg) every 8 hours as needed or naproxen 220 mg - 2 tablets (440 mg) every 12 hours as needed.  Okay to take tylenol 500 mg - 2 tablets (1000 mg) every 6-8 hours as needed, do not exceed 3000 mg in 24 hours.  Take norco sparingly for worse pain.  Rest, ice, elevate  Wear post op shoe for support and comfort.  Buddy tape 5th toe to 4th toe for support      Patient Education     Closed Toe Fracture  Your toe is broken (fractured). This causes local pain, swelling, and sometimes bruising. This injury usually takes about 4 to 6 weeks to heal, but can sometimes take longer. Toe injuries are often treated by taping the injured toe to the next one (buddy taping). This protects the injured toe and holds it in position.     If the toenail has been severely injured, it may fall off in 1 to 2 weeks. It takes up to 12 months for a new toenail to grow back.  Home care  Follow these guidelines when caring for yourself at home:    You may be given a cast shoe to wear to keep your toe from moving. If not, you can use a sandal or any shoe that doesn t put pressure on the injured toe until the swelling and pain go away. If using a sandal, be careful not to strike your foot against anything. Another injury could make the fracture worse. If you were given crutches, don t put full weight on the injured foot until you can do so without pain, or as directed by your healthcare provider.    Keep your foot elevated to reduce pain and swelling. When sleeping, put a pillow under the injured leg. When sitting, support the injured leg so it is above your waist. This is very important during the first 2 days (48 hours).    Put an ice pack on the injured area. Do this for 20 minutes every 1 to 2 hours the first day for pain relief. You can make an ice pack by wrapping a plastic bag of ice cubes in a thin towel. As the ice melts, be careful that any cloth or paper tape doesn t get wet. Continue using  the ice pack 3 to 4 times a day for the next 2 days. Then use the ice pack as needed to ease pain and swelling.    If buddy tape was used and it becomes wet or dirty, change it. You may replace it with paper, plastic, or cloth tape. Cloth tape and paper tapes must be kept dry.    You may use acetaminophen or ibuprofen to control pain, unless another pain medicine was prescribed. If you have chronic liver or kidney disease, talk with your healthcare provider before using these medicines. Also talk with your provider if you ve had a stomach ulcer or gastrointestinal bleeding.    You may return to sports or physical education activities after 4 weeks when you can run without pain, or as directed by your healthcare provider.  Follow-up care  Follow up with your healthcare provider in 1 week, or as advised. This is to make sure the bone is healing the way it should.  X-rays may be taken. You will be told of any new findings that may affect your care.  When to seek medical advice  Call your healthcare provider right away if any of these occur:    Pain or swelling gets worse    The cast/splint cracks    The cast and padding get wet and stays wet more than 24 hours    Bad odor from the cast/splint or wound fluid stains the cast    Tightness or pressure under the cast/splint gets worse    Toe becomes cold, blue, numb, or tingly    You can t move the toe    Signs of infection: fever, redness, warmth, swelling, or drainage from the wound or cast    Fever of 100.4 F (38 C) or higher, or as directed by your healthcare provider  Date Last Reviewed: 2/1/2017 2000-2018 [employer]. [address] [place] [address], [place] [address]. All rights reserved. This information is not intended as a substitute for professional medical care. Always follow your healthcare professional's instructions.

## 2019-10-01 ENCOUNTER — HEALTH MAINTENANCE LETTER (OUTPATIENT)
Age: 64
End: 2019-10-01

## 2019-12-15 ENCOUNTER — HEALTH MAINTENANCE LETTER (OUTPATIENT)
Age: 64
End: 2019-12-15

## 2021-01-15 ENCOUNTER — HEALTH MAINTENANCE LETTER (OUTPATIENT)
Age: 66
End: 2021-01-15

## 2021-09-04 ENCOUNTER — HEALTH MAINTENANCE LETTER (OUTPATIENT)
Age: 66
End: 2021-09-04

## 2021-10-30 ENCOUNTER — HEALTH MAINTENANCE LETTER (OUTPATIENT)
Age: 66
End: 2021-10-30

## 2022-02-19 ENCOUNTER — HEALTH MAINTENANCE LETTER (OUTPATIENT)
Age: 67
End: 2022-02-19

## 2022-10-16 ENCOUNTER — HEALTH MAINTENANCE LETTER (OUTPATIENT)
Age: 67
End: 2022-10-16

## 2023-04-01 ENCOUNTER — HEALTH MAINTENANCE LETTER (OUTPATIENT)
Age: 68
End: 2023-04-01

## 2023-11-04 ENCOUNTER — HEALTH MAINTENANCE LETTER (OUTPATIENT)
Age: 68
End: 2023-11-04

## (undated) DEVICE — CLIP HEMOCLIP ENDOSCOPIC INSTINCT 2.8X230CM INSC-7-230-SS

## (undated) DEVICE — NDL SCLEROTHERAPY 25GA CARR-LOCK  00711811

## (undated) DEVICE — ENDO BITE BLOCK ADULT OLYMPUS LATEX FREE MAJ-1632

## (undated) DEVICE — ENDO FORCEP ENDOJAW BIOPSY 2.8MMX160CM FB-220K

## (undated) DEVICE — KIT ENDO TURNOVER/PROCEDURE W/CLEAN A SCOPE LINERS 103888

## (undated) DEVICE — ENDO SNARE POLYPECTOMY OVAL 15MM LOOP SD-240U-15

## (undated) RX ORDER — FENTANYL CITRATE 50 UG/ML
INJECTION, SOLUTION INTRAMUSCULAR; INTRAVENOUS
Status: DISPENSED
Start: 2019-04-26